# Patient Record
Sex: MALE | Race: WHITE
[De-identification: names, ages, dates, MRNs, and addresses within clinical notes are randomized per-mention and may not be internally consistent; named-entity substitution may affect disease eponyms.]

---

## 2018-08-02 ENCOUNTER — HOSPITAL ENCOUNTER (OUTPATIENT)
Dept: HOSPITAL 96 - M.PC | Age: 63
Discharge: HOME | End: 2018-08-02
Attending: ANESTHESIOLOGY
Payer: COMMERCIAL

## 2018-08-02 DIAGNOSIS — G89.29: ICD-10-CM

## 2018-08-02 DIAGNOSIS — M54.16: Primary | ICD-10-CM

## 2018-08-02 DIAGNOSIS — Z98.890: ICD-10-CM

## 2018-08-02 DIAGNOSIS — Z79.82: ICD-10-CM

## 2018-08-02 DIAGNOSIS — N28.9: ICD-10-CM

## 2018-08-02 DIAGNOSIS — I10: ICD-10-CM

## 2018-08-02 DIAGNOSIS — Z79.899: ICD-10-CM

## 2018-08-17 NOTE — PAINCON
[address]  [address]                    PAIN MANAGEMENT CONSULTATION  
_______________________________________________________________________________
 
Name:       HEBER HOWARD              Room:                      REG CLI 
LALITHA#:  [id number]      Account #:      [id number]  
Admission:  08/02/18     Attend Phys:    PJ Quevedo MD   
Discharge:               Date of Birth:  [birth year]  
         Report #: [id number]
                                                                     [id number]  
_______________________________________________________________________________
THIS REPORT FOR:  //name//                      
 
CC: Shanti Quevedo
 
DATE OF SERVICE:  08/02/2018
 
 
CHIEF COMPLAINT:  Return of pain down into the left leg.
 
FOLLOWUP HISTORY:  The patient is a 62-year-old gentleman who has been seen in
the pain clinic in the past by Dr. Pedro Pablo Connors.  This is my first visit with
the patient.  He has a history of lumbar radicular pain.  He has undergone
epidural steroid injections in the past and gleaned benefits from these.  He has
noticed that his pain has increased over the last few months.  It involves his
low back with radiation down into his leg.  The patient states that he has been
working.  He has somewhat heavy items.  The plywood that he was moving
exacerbated his discomfort.  He denies any trauma.  No change in bowel or
bladder function.  He was given Flexeril and [place] at the clinic.  Pain
continues to be problematic.  He would like to proceed with another epidural
steroid injection.  He rates his pain as a 4-5/10.
 
ALLERGIES:  No known drug allergies.
 
CURRENT MEDICATIONS:  Aspirin 81 mg, hydrocodone 7.5 mg 1 q.6 hours p.r.n.,
meloxicam 15 mg, multivitamins, Flexeril 10 mg p.r.n.
 
PAST MEDICAL HISTORY:  Hypertension, kidney disease.
 
PAST SURGICAL HISTORY:  Right rotator cuff surgery on 03/2013, left rotator cuff
04/2008, umbilical hernia repair.
 
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
 
REVIEW OF SYSTEMS:  Questionnaire generally good health, wears glasses, ringing
in the ears, back pain, joint pain, head injury, memory loss/confusion.
 
LABORATORY DATA:
1.  MRI dated 03/17/2018 shows L4-L5 posterior disk contour appears normal at
the midline.  A 16 mm AP thecal sac diameter.  Both foramen are minimally
narrowed by minimal component of disk bulge at each foramen and developmentally,
mildly narrowed configuration.
2.  L5-S1.  Small diffuse posterior disk bulge identified with moderate size
broad-based left paracentral disk protrusion causing significant appearing mass
effect on the left lateral recess.  The foramen are not grossly narrowed.  The
thecal sac midline AP diameter is 14 mm.  The right lateral recesses is
 
 
 
[place] [address]                    PAIN MANAGEMENT CONSULTATION  
_______________________________________________________________________________
 
Name:       LEEHEBER OLIVIA              Room:                      REG CLI 
M.R.#:  [id number]      Account #:      [id number]  
Admission:  08/02/18     Attend Phys:    PJ Quevedo MD   
Discharge:               Date of Birth:  [birth year]  
         Report #: [id number]
                                                                     [id number]  
_______________________________________________________________________________
minimally narrowed.
 
PAIN CLINIC PAIN CLINIC ASSESSMENT:
1.  History of  osteoarthritis, the patient states that he has not been treated
for osteoarthritis.
2.  Height 5 feet 11 inches, weight 227 pounds, BMI is 31.6.
3.  Vital signs:  Blood pressure 138/78, heart rate 66, respiratory rate 16,
room air saturation 96%, temperature 98.8.
4.  Pain intensity 4/10.
5.  Fall risk.  The patient has not fallen, but did trip while moving plywood
and bruised his right arm.
6.  Blood thinner.  The patient is on a blood thinning medication.
7.  Hypertension.  The patient is not being treated for hypertension.
8.  Opioid medications greater than 6 weeks.  The patient is not on a chronic
opioid use.
9.  Risk assessment tool.  The patient is not that significant risk for using
opioid medication.
10.  Functional assessment tool.
11.  Recreational drug use.  The patient denies use of recreational drugs.
12.  Tobacco:  The patient stopped 15 years ago, has a 20-pack-year history.
13.  Alcohol:  The patient denies use of alcoholic beverages.
 
PHYSICAL EXAMINATION:
GENERAL:  The patient is a well-developed white male, appears his stated age. 
He is alert and oriented x 3.  His affect is appropriate.  Speech is fluent.
HEENT:  Normocephalic, atraumatic.  Extraocular eye muscles intact.  The patient
wears glasses.  Mucous membranes are moist.
NECK:  Without JVD, bruits, or adenopathy.
CHEST:  Clear to auscultation.
HEART:  Regular rate.  History of bradycardia with heart rates in the 40s-50s.
ABDOMEN:  Nontender.
EXTREMITIES:  Upper extremity muscle strength is judged to be 5/5 for the major
muscle groups in the upper extremity.  Lower extremity.  The patient has muscle
strength is 5/5 for the major muscle groups.  The patient has pain and
discomfort, his low back with pain is radiating down into the left L5-S1
dermatomal distribution.  Perceives pain down in the calf of his leg.
 
IMPRESSION:  Lumbar radiculopathy.
 
RECOMMENDATIONS:  We discussed treatment options with the patient.  Risks and
benefits of an epidural steroid injection were reviewed.  Possible complication
of the procedure, which could include but are not limited to infection,
increased muscle soreness, headache, bleeding, worsening of pain, no improvement
in pain, paralysis were discussed.  The patient elects to proceed.
 
PROCEDURE NOTE:  The patient was taken to the examination area.  He was assisted
 
 
 
[place] [address]                    PAIN MANAGEMENT CONSULTATION  
_______________________________________________________________________________
 
Name:       KIKOHOUSTON PONCEOTHY OLIVIA              Room:                      REG CLI 
M.R.#:  [id number]      Account #:      [id number]  
Admission:  08/02/18     Attend Phys:    PJ Quevedo MD   
Discharge:               Date of Birth:  [birth year]  
         Report #: [id number]
                                                                     [id number]  
_______________________________________________________________________________
in getting on the table.  He was placed in the prone position.  His back was
sterilely prepped with a prepped with a Betadine solution.  0.25% bupivacaine
was infiltrated in the low back area in the left paramedian area.  A 0.25%
bupivacaine was then infiltrated.  A 17-gauge Tuohy with loss of resistance
technique using a left paramedian approach was undertaken.  Anterior, posterior
as well as lateral viewing to confirm appropriate placement.  Total of 80 mg
Depo-Medrol, 40 mg triamcinolone and 2 mL of 0.25% bupivacaine was injected. 
The patient tolerated the procedure well.  He remained in the pain clinic for an
appropriate amount of time.  He will follow up in the future as needed.  A
script for Mobic 15 mg q.i.d., Norco 7.5 mg, Flexeril 10 mg have been written. 
The patient will call us if he has any concerns.
 
 
 
 
 
 
 
 
 
 
 
 
 
 
 
 
 
 
 
 
 
 
 
 
 
 
 
 
 
 
 
 
 
<ELECTRONICALLY SIGNED>
                                        By:  PJ Quevedo MD            
08/17/18     0837
D: 08/02/18 1625_______________________________________
T: 08/03/18 0106N. Ivan Quevedo MD               /nt